# Patient Record
Sex: MALE
[De-identification: names, ages, dates, MRNs, and addresses within clinical notes are randomized per-mention and may not be internally consistent; named-entity substitution may affect disease eponyms.]

---

## 2022-07-12 ENCOUNTER — HOSPITAL ENCOUNTER (OUTPATIENT)
Dept: HOSPITAL 5 - CT | Age: 52
Discharge: HOME | End: 2022-07-12
Attending: FAMILY MEDICINE
Payer: COMMERCIAL

## 2022-07-12 DIAGNOSIS — N20.0: Primary | ICD-10-CM

## 2022-07-12 DIAGNOSIS — K76.89: ICD-10-CM

## 2022-07-12 PROCEDURE — 74176 CT ABD & PELVIS W/O CONTRAST: CPT

## 2022-07-12 NOTE — CAT SCAN REPORT
CT ABDOMEN AND PELVIS WITHOUT CONTRAST



HISTORY: R10.31 ABDOMINAL PAIN



COMPARISON: CT pelvis with contrast 9/29/2015



TECHNIQUE: Axial CT images were obtained through the abdomen and pelvis without IV contrast. Sagittal
 and coronal reformatted images. All CT scans at this location are performed using CT dose reduction 
for ALARA by means of automated exposure control.





FINDINGS:



CT ABDOMEN:

Lung Bases: Clear.

Liver: There are 2 subcentimeter hypodensities in the left hepatic lobe which are too small to charac
terize but likely represent tiny cysts or hemangiomas. The liver is otherwise unremarkable.

Biliary: No significant abnormality.

Spleen: No significant abnormality.  Unenlarged.

Pancreas: No significant abnormality.

Adrenals: No significant abnormality.

Kidneys: One or 2 punctate calyceal stones are identified in both kidneys. No focal renal lesion, ure
teral stone or hydronephrosis is appreciated.

Lymphatics: No lymphadenopathy.

Vasculature: No significant abnormality. 

Bowel/Peritoneum: There is a large amount of fecal matter throughout the colon. No evidence for obstr
uction, focal inflammation or obvious mass. The remaining bowel loops are unremarkable. The appendix 
is not confidently identified. No evidence for ascites, fluid collection or free air.



CT PELVIS:

: No significant abnormality.



Osseous Structures: No significant abnormality.

Additional Findings: There are multiple surgical clips in the left inguinal region which appears to r
epresent previous left inguinal hernia repair. No recurrent hernia is detected.



IMPRESSION:

No acute process.

Punctate nonobstructing renal stones.

Constipation.

Tiny left hepatic lobe hypodensities as described. If further evaluation is needed multi phase CT or 
MRI with contrast is recommended.

Previous left inguinal hernia repair is suspected with no evidence for recurrent hernia.



Signer Name: Emiliano Gonzales Jr, MD 

Signed: 7/12/2022 8:37 AM

Workstation Name: [id number]